# Patient Record
Sex: FEMALE | Race: OTHER | NOT HISPANIC OR LATINO | URBAN - METROPOLITAN AREA
[De-identification: names, ages, dates, MRNs, and addresses within clinical notes are randomized per-mention and may not be internally consistent; named-entity substitution may affect disease eponyms.]

---

## 2018-03-30 ENCOUNTER — EMERGENCY (EMERGENCY)
Facility: HOSPITAL | Age: 38
LOS: 1 days | Discharge: HOME | End: 2018-03-30
Admitting: EMERGENCY MEDICINE
Payer: MEDICAID

## 2018-03-30 VITALS
TEMPERATURE: 98 F | OXYGEN SATURATION: 99 % | DIASTOLIC BLOOD PRESSURE: 80 MMHG | SYSTOLIC BLOOD PRESSURE: 126 MMHG | HEART RATE: 64 BPM | RESPIRATION RATE: 16 BRPM

## 2018-03-30 PROCEDURE — 99283 EMERGENCY DEPT VISIT LOW MDM: CPT | Mod: 25

## 2018-03-30 NOTE — ED ADULT TRIAGE NOTE - CHIEF COMPLAINT QUOTE
Pt  translating for pt, states pt is trying to stop breastfeeding but is having B/L breast pain 2/2 decreased pumping/feeding. Taking Motrin at home without relief. Denies medical hx.

## 2018-03-31 RX ORDER — ACETAMINOPHEN 500 MG
975 TABLET ORAL ONCE
Qty: 0 | Refills: 0 | Status: COMPLETED | OUTPATIENT
Start: 2018-03-31 | End: 2018-03-31

## 2018-03-31 RX ADMIN — Medication 975 MILLIGRAM(S): at 01:01

## 2018-03-31 NOTE — ED PROVIDER NOTE - CARE PLAN
Principal Discharge DX:	Breast pain  Assessment and plan of treatment:	Rest, drink plenty of fluids.  Advance activity as tolerated.  Continue all previously prescribed medications as directed. Use ice packs on your breasts and compression wrap on breasts to help with discomfort. Continue taking Motrin as directed, you may also take Tylenol 650mg every 6 hours for pain. Follow up with your primary care physician in 48-72 hours- bring copies of your results. Follow up with your OB/GYN for continued management. Return to the Emergency Department for fevers, redness of breasts, worsening or persistent symptoms OR ANY NEW OR CONCERNING SYMPTOMS.

## 2018-03-31 NOTE — ED PROVIDER NOTE - OBJECTIVE STATEMENT
37 y.o female no pmhx here with b/l breast pain for the past 2 months but has been worsening the past 3 days after trying to wean off breast feeding. Pt had uncomplicated delivery 3 months ago and has been breast feeding however her baby has stopped taking milk so she has had to wean off and pump. Saw her OB/GYN 3 days ago who advised her to start decreasing how much she pumps and gave a rx for motrin 800mg which pt states has not been helping. Denies fevers, chills, chest pain, SOB, cough, n/v/d, abdominal pain, numbness, tingling, weakness, urinary symptoms. 37 y.o female no pmhx here with b/l breast pain for the past 2 months but has been worsening the past 3 days after trying to wean off breast feeding. Pt had uncomplicated delivery 3 months ago and has been breast feeding however her baby has stopped taking milk so she has had to wean off and pump. Saw her OB/GYN 3 days ago who advised her to start decreasing how much she pumps and gave a rx for motrin 800mg which pt states has not been helping. Denies fevers, chills, chest pain, SOB, cough, n/v/d, abdominal pain, numbness, tingling, weakness, urinary symptoms. Pt offered translation services, prefers  to translate.

## 2018-03-31 NOTE — ED PROVIDER NOTE - PLAN OF CARE
Rest, drink plenty of fluids.  Advance activity as tolerated.  Continue all previously prescribed medications as directed. Use ice packs on your breasts and compression wrap on breasts to help with discomfort. Continue taking Motrin as directed, you may also take Tylenol 650mg every 6 hours for pain. Follow up with your primary care physician in 48-72 hours- bring copies of your results. Follow up with your OB/GYN for continued management. Return to the Emergency Department for fevers, redness of breasts, worsening or persistent symptoms OR ANY NEW OR CONCERNING SYMPTOMS.

## 2018-03-31 NOTE — ED PROVIDER NOTE - PHYSICAL EXAMINATION
breast exam chaperone by Dr. Hayes  breast equal in size, engorged, no nipple discharge, no erythema, no induration or signs of infection

## 2018-03-31 NOTE — ED PROVIDER NOTE - MEDICAL DECISION MAKING DETAILS
37 y.o female no pmhx here with b/l breast pain after trying to wean off breastfeeding- on exam breasts engorged no signs of infection- will give ice packs, ace wrap for compression, tylenol ( pt took motrin prior to arrival) and dc home with OB/GYN follow up.

## 2018-03-31 NOTE — ED PROVIDER NOTE - PROGRESS NOTE DETAILS
[facility] booth: spoke to gyn for recs advise motrin, compression wraps and ice. advised pt and will need to follow up with GYN.

## 2019-02-24 ENCOUNTER — EMERGENCY (EMERGENCY)
Facility: HOSPITAL | Age: 39
LOS: 1 days | Discharge: HOME | End: 2019-02-24
Attending: EMERGENCY MEDICINE | Admitting: EMERGENCY MEDICINE
Payer: MEDICAID

## 2019-02-24 VITALS
OXYGEN SATURATION: 99 % | TEMPERATURE: 98 F | RESPIRATION RATE: 16 BRPM | DIASTOLIC BLOOD PRESSURE: 78 MMHG | SYSTOLIC BLOOD PRESSURE: 116 MMHG | HEART RATE: 84 BPM

## 2019-02-24 VITALS — WEIGHT: 132.06 LBS

## 2019-02-24 LAB
ANION GAP SERPL CALC-SCNC: 12 MMO/L — SIGNIFICANT CHANGE UP (ref 7–14)
APPEARANCE UR: CLEAR — SIGNIFICANT CHANGE UP
BASOPHILS # BLD AUTO: 0.02 K/UL — SIGNIFICANT CHANGE UP (ref 0–0.2)
BASOPHILS NFR BLD AUTO: 0.3 % — SIGNIFICANT CHANGE UP (ref 0–2)
BILIRUB UR-MCNC: NEGATIVE — SIGNIFICANT CHANGE UP
BLD GP AB SCN SERPL QL: NEGATIVE — SIGNIFICANT CHANGE UP
BLOOD UR QL VISUAL: SIGNIFICANT CHANGE UP
BUN SERPL-MCNC: 6 MG/DL — LOW (ref 7–23)
CALCIUM SERPL-MCNC: 9.2 MG/DL — SIGNIFICANT CHANGE UP (ref 8.4–10.5)
CHLORIDE SERPL-SCNC: 102 MMOL/L — SIGNIFICANT CHANGE UP (ref 98–107)
CO2 SERPL-SCNC: 22 MMOL/L — SIGNIFICANT CHANGE UP (ref 22–31)
COLOR SPEC: SIGNIFICANT CHANGE UP
CREAT SERPL-MCNC: 0.48 MG/DL — LOW (ref 0.5–1.3)
EOSINOPHIL # BLD AUTO: 0.1 K/UL — SIGNIFICANT CHANGE UP (ref 0–0.5)
EOSINOPHIL NFR BLD AUTO: 1.6 % — SIGNIFICANT CHANGE UP (ref 0–6)
GLUCOSE SERPL-MCNC: 91 MG/DL — SIGNIFICANT CHANGE UP (ref 70–99)
GLUCOSE UR-MCNC: NEGATIVE — SIGNIFICANT CHANGE UP
HCG SERPL-ACNC: SIGNIFICANT CHANGE UP MIU/ML
HCT VFR BLD CALC: 39.4 % — SIGNIFICANT CHANGE UP (ref 34.5–45)
HGB BLD-MCNC: 12.9 G/DL — SIGNIFICANT CHANGE UP (ref 11.5–15.5)
IMM GRANULOCYTES NFR BLD AUTO: 0.3 % — SIGNIFICANT CHANGE UP (ref 0–1.5)
KETONES UR-MCNC: NEGATIVE — SIGNIFICANT CHANGE UP
LEUKOCYTE ESTERASE UR-ACNC: NEGATIVE — SIGNIFICANT CHANGE UP
LYMPHOCYTES # BLD AUTO: 2.33 K/UL — SIGNIFICANT CHANGE UP (ref 1–3.3)
LYMPHOCYTES # BLD AUTO: 37.2 % — SIGNIFICANT CHANGE UP (ref 13–44)
MCHC RBC-ENTMCNC: 28.4 PG — SIGNIFICANT CHANGE UP (ref 27–34)
MCHC RBC-ENTMCNC: 32.7 % — SIGNIFICANT CHANGE UP (ref 32–36)
MCV RBC AUTO: 86.6 FL — SIGNIFICANT CHANGE UP (ref 80–100)
MONOCYTES # BLD AUTO: 0.4 K/UL — SIGNIFICANT CHANGE UP (ref 0–0.9)
MONOCYTES NFR BLD AUTO: 6.4 % — SIGNIFICANT CHANGE UP (ref 2–14)
NEUTROPHILS # BLD AUTO: 3.39 K/UL — SIGNIFICANT CHANGE UP (ref 1.8–7.4)
NEUTROPHILS NFR BLD AUTO: 54.2 % — SIGNIFICANT CHANGE UP (ref 43–77)
NITRITE UR-MCNC: NEGATIVE — SIGNIFICANT CHANGE UP
NRBC # FLD: 0 K/UL — LOW (ref 25–125)
PH UR: 6.5 — SIGNIFICANT CHANGE UP (ref 5–8)
PLATELET # BLD AUTO: 332 K/UL — SIGNIFICANT CHANGE UP (ref 150–400)
PMV BLD: 9.5 FL — SIGNIFICANT CHANGE UP (ref 7–13)
POTASSIUM SERPL-MCNC: 3.6 MMOL/L — SIGNIFICANT CHANGE UP (ref 3.5–5.3)
POTASSIUM SERPL-SCNC: 3.6 MMOL/L — SIGNIFICANT CHANGE UP (ref 3.5–5.3)
PROT UR-MCNC: NEGATIVE — SIGNIFICANT CHANGE UP
RBC # BLD: 4.55 M/UL — SIGNIFICANT CHANGE UP (ref 3.8–5.2)
RBC # FLD: 13.1 % — SIGNIFICANT CHANGE UP (ref 10.3–14.5)
RBC CASTS # UR COMP ASSIST: SIGNIFICANT CHANGE UP (ref 0–?)
RH IG SCN BLD-IMP: POSITIVE — SIGNIFICANT CHANGE UP
SODIUM SERPL-SCNC: 136 MMOL/L — SIGNIFICANT CHANGE UP (ref 135–145)
SP GR SPEC: 1.01 — SIGNIFICANT CHANGE UP (ref 1–1.04)
UROBILINOGEN FLD QL: NORMAL — SIGNIFICANT CHANGE UP
WBC # BLD: 6.26 K/UL — SIGNIFICANT CHANGE UP (ref 3.8–10.5)
WBC # FLD AUTO: 6.26 K/UL — SIGNIFICANT CHANGE UP (ref 3.8–10.5)
WBC UR QL: SIGNIFICANT CHANGE UP (ref 0–?)

## 2019-02-24 PROCEDURE — 76817 TRANSVAGINAL US OBSTETRIC: CPT | Mod: 26

## 2019-02-24 PROCEDURE — 99284 EMERGENCY DEPT VISIT MOD MDM: CPT | Mod: 25

## 2019-02-24 NOTE — ED ADULT NURSE NOTE - OBJECTIVE STATEMENT
Pt sitting quielty with  at [facility]. Returned from u/s, smiling pleasant affect. Pt minimal english/Lao  translation service offered...pt indicating  for translation. As per  pt has been having vaginal spotting for past 10 days...was seen at clinic told to follow up in ED. She has no pain now she is 11 weeks pregnant. " as per MD Simmons...no sl send labs and urine as per orders.

## 2019-02-24 NOTE — ED ADULT TRIAGE NOTE - CHIEF COMPLAINT QUOTE
Pt walk in c/o Vaginal Spotting for 10 days on and off, , Pt is 11 weeks pregnant. Denies Abdominal Cramping, Dysuria N V Ha dizziness SOB CP palpitation

## 2019-02-24 NOTE — ED PROVIDER NOTE - ATTENDING CONTRIBUTION TO CARE
I performed a face to face bedside interview with patient regarding history of present illness, review of symptoms and past medical history. I completed an independent physical exam.  I have discussed patient's plan of care.   I agree with note as stated above, having amended the EMR as needed to reflect my findings. I have discussed the assessment and plan of care.  This includes during the time I functioned as the attending physician for this patient.  Attending Contribution to Care: agree with plan of resident.  pregnant 10 week pw vaginal spotting, for 1 day, using a few pads the past day, dark blood. female with vaginal bleeding during pregnancy. will assess with US, labs, rh test. likely dc with ob follow up.

## 2019-02-24 NOTE — ED PROVIDER NOTE - NSFOLLOWUPINSTRUCTIONS_ED_ALL_ED_FT
1) Follow up with your doctor in 1 week.   2) Return to the ER immediately for new or worsening symptoms including abdominal pain, fevers or other issues.   3) Take the prenatal vitamin. drink plenty of fluids.

## 2019-02-24 NOTE — ED PROVIDER NOTE - CLINICAL SUMMARY MEDICAL DECISION MAKING FREE TEXT BOX
female with vaginal bleeding during pregnancy. will assess with US, labs, rh test. likely dc with ob follow up.

## 2019-02-24 NOTE — ED PROVIDER NOTE - OBJECTIVE STATEMENT
38yoF pregnant 10 week pw vaginal spotting, for 1 day, using a few pads the past day, dark blood. no clots. denies fevers, chills abd pain, chest pain, weakness, numbness, dysuria, frequency, falls, or other issues.

## 2019-03-10 NOTE — ED PROVIDER NOTE - CPE EDP GASTRO NORM
Patient has gone through multiple IVs and needs IVF with postassium. RN attempted to place new IV with no luck. Critical care and other resources too busy to help. RN paged the Hospitalist, Dr. Nathaly Van and orders were received for a central line if needed. normal...

## 2025-02-21 NOTE — ED ADULT NURSE NOTE - NS PRO AD PATIENT TYPE
